# Patient Record
Sex: MALE | Race: WHITE
[De-identification: names, ages, dates, MRNs, and addresses within clinical notes are randomized per-mention and may not be internally consistent; named-entity substitution may affect disease eponyms.]

---

## 2019-03-05 ENCOUNTER — HOSPITAL ENCOUNTER (OUTPATIENT)
Dept: HOSPITAL 75 - RAD | Age: 64
End: 2019-03-05
Attending: INTERNAL MEDICINE
Payer: COMMERCIAL

## 2019-03-05 DIAGNOSIS — N28.1: ICD-10-CM

## 2019-03-05 DIAGNOSIS — G47.33: ICD-10-CM

## 2019-03-05 DIAGNOSIS — I10: ICD-10-CM

## 2019-03-05 DIAGNOSIS — J44.9: ICD-10-CM

## 2019-03-05 DIAGNOSIS — I70.203: Primary | ICD-10-CM

## 2019-03-05 DIAGNOSIS — E78.2: ICD-10-CM

## 2019-03-05 LAB
BUN/CREAT SERPL: 12
CREAT SERPL-MCNC: 1.06 MG/DL (ref 0.6–1.3)
GFR SERPLBLD BASED ON 1.73 SQ M-ARVRAT: > 60 ML/MIN

## 2019-03-05 PROCEDURE — 36415 COLL VENOUS BLD VENIPUNCTURE: CPT

## 2019-03-05 PROCEDURE — 84520 ASSAY OF UREA NITROGEN: CPT

## 2019-03-05 PROCEDURE — 82565 ASSAY OF CREATININE: CPT

## 2019-03-05 NOTE — DIAGNOSTIC IMAGING REPORT
INDICATION: Hypertension and claudication and increased serum

lipids. CTA of the abdomen and pelvis and lower extremities

performed with IV contrast bolus and axial slices and MIP

reconstructions.



FINDINGS: Visualized portions of the liver and gallbladder were

normal. The entire liver was not imaged. The adrenals and

pancreas were normal. There is a prominent cyst in the right

kidney measuring 6.5 cm. There is no retroperitoneal mass or

adenopathy. There is no ascites or abnormal fluid collection.

There are uncomplicated sigmoid diverticula. There is prostatic

enlargement. There is a right knee prosthesis.



CTA images demonstrate the abdominal aorta to be patent and

nonaneurysmal. There is no significant plaquing in the infrarenal

aorta. The common iliac arteries and internal iliac arteries and

external iliac arteries are patent and without significant

plaquing or narrowing. The common femoral arteries and profunda

femoris arteries are patent. The superficial femoral arteries are

patent on both sides with minor plaquing in the mid SFA on both

sides. There is no significant stenosis. The popliteal arteries

on both sides are patent. All three tibial vessels on both sides

are patent and without stenosis or occlusion.



IMPRESSION: No significant peripheral vascular disease. There is

no major vessel occlusion or stenosis. There is minor plaquing in

the mid SFA on both sides. A prominent right renal cyst is

incidentally noted. There is no acute abnormality.



Dictated by: 



  Dictated on workstation # [id number]

## 2019-03-06 ENCOUNTER — HOSPITAL ENCOUNTER (OUTPATIENT)
Dept: HOSPITAL 75 - CARD | Age: 64
End: 2019-03-06
Attending: INTERNAL MEDICINE
Payer: COMMERCIAL

## 2019-03-06 VITALS — SYSTOLIC BLOOD PRESSURE: 134 MMHG | DIASTOLIC BLOOD PRESSURE: 82 MMHG

## 2019-03-06 VITALS — WEIGHT: 239 LBS | HEIGHT: 72 IN | BODY MASS INDEX: 32.37 KG/M2

## 2019-03-06 DIAGNOSIS — J44.9: ICD-10-CM

## 2019-03-06 DIAGNOSIS — E78.2: ICD-10-CM

## 2019-03-06 DIAGNOSIS — I10: ICD-10-CM

## 2019-03-06 DIAGNOSIS — G47.33: ICD-10-CM

## 2019-03-06 DIAGNOSIS — I73.9: Primary | ICD-10-CM

## 2019-03-06 PROCEDURE — 93017 CV STRESS TEST TRACING ONLY: CPT

## 2019-03-06 PROCEDURE — 93306 TTE W/DOPPLER COMPLETE: CPT

## 2019-03-06 PROCEDURE — 78452 HT MUSCLE IMAGE SPECT MULT: CPT

## 2019-03-06 NOTE — STRESS TEST
DATE OF SERVICE:  03/06/2019



LEXISCAN MYOVIEW STRESS TEST REPORT



REFERRING PHYSICIAN:

Dr. Fregoso.



FINDINGS:

Baseline heart rate is 68.  Baseline blood pressure is 139/84.  Baseline EKG is

sinus rhythm with no ischemic changes.



SUMMARY:

The patient was injected with 10.93 mCi of technetium-99 Myoview and the resting

images were obtained.  Then, the patient started exercising with a baseline

heart rate, blood pressure and EKG mentioned above.  The patient was able to

exercise for 4 minutes and 40 seconds on a standard Moustapha protocol, did not

achieve the target heart rate.  Test was terminated and converted to Lexiscan

Myoview stress test.



The patient received 0.4 mg of Lexiscan followed by 30.5 mCi of technetium-99

Myoview.  Throughout the test, there were no EKG changes.



The resting and stress images were reviewed and compared in the short axis,

horizontal long axis and vertical long axis views.  Review of the images showed

good radiotracer uptake with diaphragmatic attenuation, no significant ischemia

or infarction.  SSS is 0.  TID value is 1.04.  On the gated images, the left

ventricle appeared to be in normal size with normal contractility.  Calculated

ejection fraction is 59%.



CONCLUSION:

1.  The patient was able to exercise for 4 minutes 40 seconds on a standard

Moustapha protocol, did not achieve the target heart rate, test was terminated and

converted to Lexiscan Myoview stress test.

2.  The patient tolerated the Lexiscan well.

3.  Diaphragmatic attenuation with typical male pattern with no significant

ischemia or infarction on SPECT images.

4.  Normal left ventricular size with normal contractility and calculated

ejection fraction of 59%.





Job ID: [id number]

DocumentID: [id number]

Dictated Date:  03/06/2019 15:21:18

Transcription Date: 03/06/2019 15:43:58

Dictated By: MATILDE LASSITER MD

## 2019-10-01 ENCOUNTER — HOSPITAL ENCOUNTER (OUTPATIENT)
Dept: HOSPITAL 75 - RT | Age: 64
End: 2019-10-01
Attending: NURSE PRACTITIONER
Payer: COMMERCIAL

## 2019-10-01 DIAGNOSIS — G47.33: ICD-10-CM

## 2019-10-01 DIAGNOSIS — Z72.0: ICD-10-CM

## 2019-10-01 DIAGNOSIS — J30.9: Primary | ICD-10-CM

## 2019-10-01 LAB
ARTERIAL PATENCY WRIST A: (no result)
BASE EXCESS STD BLDA CALC-SCNC: -1.1 MMOL/L (ref -2.5–2.5)
BDY SITE: (no result)
BODY TEMPERATURE: 99.1
CO2 BLDA CALC-SCNC: 24.3 MMOL/L (ref 21–31)
INHALED O2 FLOW RATE: (no result) L/MIN
PCO2 BLDA: 39 MMHG (ref 35–45)
PH BLDA: 7.39 [PH] (ref 7.37–7.43)
PO2 BLDA: 95 MMHG (ref 79–93)
SAO2 % BLDA FROM PO2: 98 % (ref 94–100)
VENTILATION MODE VENT: NO

## 2019-10-01 PROCEDURE — 36600 WITHDRAWAL OF ARTERIAL BLOOD: CPT

## 2019-10-01 PROCEDURE — 82805 BLOOD GASES W/O2 SATURATION: CPT

## 2019-11-06 ENCOUNTER — HOSPITAL ENCOUNTER (OUTPATIENT)
Dept: HOSPITAL 75 - RAD | Age: 64
End: 2019-11-06
Attending: NURSE PRACTITIONER
Payer: COMMERCIAL

## 2019-11-06 DIAGNOSIS — K76.0: ICD-10-CM

## 2019-11-06 DIAGNOSIS — Z72.0: ICD-10-CM

## 2019-11-06 DIAGNOSIS — G47.33: ICD-10-CM

## 2019-11-06 DIAGNOSIS — J30.9: Primary | ICD-10-CM

## 2019-11-06 LAB
BUN/CREAT SERPL: 10
CREAT SERPL-MCNC: 1.24 MG/DL (ref 0.6–1.3)
GFR SERPLBLD BASED ON 1.73 SQ M-ARVRAT: 59 ML/MIN

## 2019-11-06 PROCEDURE — 36415 COLL VENOUS BLD VENIPUNCTURE: CPT

## 2019-11-06 PROCEDURE — 94726 PLETHYSMOGRAPHY LUNG VOLUMES: CPT

## 2019-11-06 PROCEDURE — 82565 ASSAY OF CREATININE: CPT

## 2019-11-06 PROCEDURE — 84520 ASSAY OF UREA NITROGEN: CPT

## 2019-11-06 PROCEDURE — 71260 CT THORAX DX C+: CPT

## 2019-11-06 PROCEDURE — 94729 DIFFUSING CAPACITY: CPT

## 2019-11-06 PROCEDURE — 94060 EVALUATION OF WHEEZING: CPT

## 2019-11-06 NOTE — DIAGNOSTIC IMAGING REPORT
PROCEDURE: CT chest with contrast only.



TECHNIQUE: Multiple contiguous axial images were obtained through

the chest after administration of intravenous contrast. Auto

Exposure Controls were utilized during the CT exam to meet ALARA

standards for radiation dose reduction. 



INDICATION:  Dyspnea.



COMPARISON: None available.



FINDINGS: No significant adenopathy within the chest. Two-vessel

aortic arch is noted. No aneurysmal dilatation of the thoracic

aorta. The heart is within normal limits in size. No pericardial

effusion. No pleural effusion. No pneumothorax. Minimal bibasilar

scarring and/or atelectasis. The lungs otherwise appear clear.

The airway is patent. Fatty infiltration of the liver. The

visualized upper abdomen is otherwise unremarkable. Scattered

osseous degenerative changes without acute osseous abnormality.



IMPRESSION: No acute abnormality.



Fatty infiltration of the liver.



Dictated by: 



  Dictated on workstation # [id number]

## 2019-12-18 NOTE — NUR
Pt contacted me today to let me know he is ok with going ahead and starting pulmonary rehab. 
I had contacted pt last week to let him know that his insurance stated they would cover 30% 
until January 2020, then he would be responsible for 100% starting in January 2020, until 
deductible met again, then 30%. After he and his wife checked with financial aid, pt said he 
would be ok to go ahead and start. Pt is aware of deductible to meet starting January 2020; 
and his responsibility.

## 2020-01-07 VITALS — DIASTOLIC BLOOD PRESSURE: 80 MMHG | SYSTOLIC BLOOD PRESSURE: 130 MMHG

## 2020-01-07 VITALS — SYSTOLIC BLOOD PRESSURE: 127 MMHG | DIASTOLIC BLOOD PRESSURE: 97 MMHG

## 2020-01-09 VITALS — SYSTOLIC BLOOD PRESSURE: 140 MMHG | DIASTOLIC BLOOD PRESSURE: 60 MMHG

## 2020-01-09 VITALS — SYSTOLIC BLOOD PRESSURE: 130 MMHG | DIASTOLIC BLOOD PRESSURE: 60 MMHG

## 2020-01-14 VITALS — SYSTOLIC BLOOD PRESSURE: 138 MMHG | DIASTOLIC BLOOD PRESSURE: 90 MMHG

## 2020-01-14 VITALS — SYSTOLIC BLOOD PRESSURE: 122 MMHG | DIASTOLIC BLOOD PRESSURE: 90 MMHG

## 2020-01-16 VITALS — SYSTOLIC BLOOD PRESSURE: 133 MMHG | DIASTOLIC BLOOD PRESSURE: 60 MMHG

## 2020-01-16 VITALS — DIASTOLIC BLOOD PRESSURE: 60 MMHG | SYSTOLIC BLOOD PRESSURE: 120 MMHG

## 2020-01-21 VITALS — DIASTOLIC BLOOD PRESSURE: 80 MMHG | SYSTOLIC BLOOD PRESSURE: 140 MMHG

## 2020-01-21 VITALS — SYSTOLIC BLOOD PRESSURE: 130 MMHG | DIASTOLIC BLOOD PRESSURE: 60 MMHG

## 2020-01-23 VITALS — DIASTOLIC BLOOD PRESSURE: 90 MMHG | SYSTOLIC BLOOD PRESSURE: 140 MMHG

## 2020-01-23 VITALS — SYSTOLIC BLOOD PRESSURE: 120 MMHG | DIASTOLIC BLOOD PRESSURE: 76 MMHG

## 2020-01-28 VITALS — SYSTOLIC BLOOD PRESSURE: 118 MMHG | DIASTOLIC BLOOD PRESSURE: 90 MMHG

## 2020-01-28 VITALS — SYSTOLIC BLOOD PRESSURE: 130 MMHG | DIASTOLIC BLOOD PRESSURE: 60 MMHG

## 2020-01-30 VITALS — SYSTOLIC BLOOD PRESSURE: 140 MMHG | DIASTOLIC BLOOD PRESSURE: 50 MMHG

## 2020-01-30 VITALS — SYSTOLIC BLOOD PRESSURE: 138 MMHG | DIASTOLIC BLOOD PRESSURE: 82 MMHG

## 2020-02-04 VITALS — SYSTOLIC BLOOD PRESSURE: 130 MMHG | DIASTOLIC BLOOD PRESSURE: 90 MMHG

## 2020-02-04 VITALS — SYSTOLIC BLOOD PRESSURE: 110 MMHG | DIASTOLIC BLOOD PRESSURE: 88 MMHG

## 2020-02-06 VITALS — SYSTOLIC BLOOD PRESSURE: 137 MMHG | DIASTOLIC BLOOD PRESSURE: 88 MMHG

## 2020-02-06 VITALS — DIASTOLIC BLOOD PRESSURE: 81 MMHG | SYSTOLIC BLOOD PRESSURE: 122 MMHG

## 2020-02-11 VITALS — DIASTOLIC BLOOD PRESSURE: 87 MMHG | SYSTOLIC BLOOD PRESSURE: 130 MMHG

## 2020-02-11 VITALS — DIASTOLIC BLOOD PRESSURE: 93 MMHG | SYSTOLIC BLOOD PRESSURE: 130 MMHG

## 2020-02-13 VITALS — DIASTOLIC BLOOD PRESSURE: 60 MMHG | SYSTOLIC BLOOD PRESSURE: 172 MMHG

## 2020-02-13 VITALS — SYSTOLIC BLOOD PRESSURE: 140 MMHG | DIASTOLIC BLOOD PRESSURE: 60 MMHG

## 2020-02-18 VITALS — DIASTOLIC BLOOD PRESSURE: 89 MMHG | SYSTOLIC BLOOD PRESSURE: 112 MMHG

## 2020-02-18 VITALS — DIASTOLIC BLOOD PRESSURE: 60 MMHG | SYSTOLIC BLOOD PRESSURE: 130 MMHG

## 2020-02-20 VITALS — SYSTOLIC BLOOD PRESSURE: 118 MMHG | DIASTOLIC BLOOD PRESSURE: 80 MMHG

## 2020-02-20 VITALS — DIASTOLIC BLOOD PRESSURE: 90 MMHG | SYSTOLIC BLOOD PRESSURE: 121 MMHG

## 2020-02-25 VITALS — DIASTOLIC BLOOD PRESSURE: 60 MMHG | SYSTOLIC BLOOD PRESSURE: 138 MMHG

## 2020-02-25 VITALS — DIASTOLIC BLOOD PRESSURE: 72 MMHG | SYSTOLIC BLOOD PRESSURE: 130 MMHG

## 2020-02-27 VITALS — DIASTOLIC BLOOD PRESSURE: 88 MMHG | SYSTOLIC BLOOD PRESSURE: 138 MMHG

## 2020-02-27 VITALS — SYSTOLIC BLOOD PRESSURE: 140 MMHG | DIASTOLIC BLOOD PRESSURE: 60 MMHG

## 2020-03-03 ENCOUNTER — HOSPITAL ENCOUNTER (OUTPATIENT)
Dept: HOSPITAL 75 - PULM | Age: 65
LOS: 7 days | Discharge: HOME | End: 2020-03-10
Attending: NURSE PRACTITIONER
Payer: COMMERCIAL

## 2020-03-03 VITALS — DIASTOLIC BLOOD PRESSURE: 50 MMHG | SYSTOLIC BLOOD PRESSURE: 128 MMHG

## 2020-03-03 VITALS — SYSTOLIC BLOOD PRESSURE: 160 MMHG | DIASTOLIC BLOOD PRESSURE: 60 MMHG

## 2020-03-03 DIAGNOSIS — J44.9: Primary | ICD-10-CM

## 2020-03-03 PROCEDURE — 99211 OFF/OP EST MAY X REQ PHY/QHP: CPT

## 2020-03-05 VITALS — DIASTOLIC BLOOD PRESSURE: 87 MMHG | SYSTOLIC BLOOD PRESSURE: 127 MMHG

## 2020-03-05 VITALS — DIASTOLIC BLOOD PRESSURE: 82 MMHG | SYSTOLIC BLOOD PRESSURE: 125 MMHG

## 2020-11-09 ENCOUNTER — HOSPITAL ENCOUNTER (OUTPATIENT)
Dept: HOSPITAL 75 - RAD | Age: 65
End: 2020-11-09
Attending: NURSE PRACTITIONER
Payer: COMMERCIAL

## 2020-11-09 DIAGNOSIS — Z72.0: ICD-10-CM

## 2020-11-09 DIAGNOSIS — R91.8: Primary | ICD-10-CM

## 2020-11-09 NOTE — DIAGNOSTIC IMAGING REPORT
CT CHEST SCREENING WO



TECHNIQUE: Low-dose unenhanced CT of the chest was performed

according to the screening protocol. Coronal MIP and sagittal MPR

reformats are created. Automatic exposure controls were utilized

to keep dose as low as reasonably achievable.



INDICATION: 44 pack year history of smoking. Quit smoking one

year ago.



COMPARISON: CT chest of 11/06/2019



FINDINGS:



Pulmonary findings: No endoluminal nodule within the trachea. No

pulmonary mass or consolidation. Scattered peripheral groundglass

opacities have developed and are most indicative of sequelae of

infection/inflammation. No pulmonary nodule or concerning

groundglass nodules. Mild centrilobular emphysema is similar

appearance to prior exam.



Extrapulmonary findings: No pleural effusion or axillary

lymphadenopathy. No mediastinal, discrete hilar or juxtaphrenic

lymphadenopathy. Heart is normal in size without pericardial

effusion. Dense coronary artery calcifications are unchanged.

Esophagus is unremarkable. Stable low-attenuation liver raises

possibility hepatic steatosis. No worrisome focal osseous

lesions.



IMPRESSION: 

1. Screening exam is negative for features of clinically active

lung cancer.

2. Multifocal subpleural groundglass opacities are new since exam

1 year prior, and/or most compatible with with sequelae of

infectious/inflammatory process.



Lung-RADS category: 2 - Benign appearance or behavior



Recommendations: Continued annual screening with low-dose CT in

12 months.



Dictated by: 



  Dictated on workstation # [id number]

## 2021-02-04 ENCOUNTER — HOSPITAL ENCOUNTER (OUTPATIENT)
Dept: HOSPITAL 75 - RT | Age: 66
End: 2021-02-04
Attending: NURSE PRACTITIONER
Payer: COMMERCIAL

## 2021-02-04 DIAGNOSIS — J44.9: Primary | ICD-10-CM

## 2021-02-04 PROCEDURE — 94726 PLETHYSMOGRAPHY LUNG VOLUMES: CPT

## 2021-02-04 PROCEDURE — 94729 DIFFUSING CAPACITY: CPT

## 2021-02-04 PROCEDURE — 94060 EVALUATION OF WHEEZING: CPT

## 2021-02-12 ENCOUNTER — HOSPITAL ENCOUNTER (OUTPATIENT)
Dept: HOSPITAL 75 - RAD | Age: 66
End: 2021-02-12
Attending: NURSE PRACTITIONER
Payer: COMMERCIAL

## 2021-02-12 DIAGNOSIS — K76.0: ICD-10-CM

## 2021-02-12 DIAGNOSIS — J43.2: Primary | ICD-10-CM

## 2021-02-12 DIAGNOSIS — I25.10: ICD-10-CM

## 2021-02-12 LAB
BUN/CREAT SERPL: 13
CREAT SERPL-MCNC: 1.16 MG/DL (ref 0.6–1.3)
GFR SERPLBLD BASED ON 1.73 SQ M-ARVRAT: > 60 ML/MIN

## 2021-02-12 PROCEDURE — 36415 COLL VENOUS BLD VENIPUNCTURE: CPT

## 2021-02-12 PROCEDURE — 82565 ASSAY OF CREATININE: CPT

## 2021-02-12 PROCEDURE — 84520 ASSAY OF UREA NITROGEN: CPT

## 2021-02-12 PROCEDURE — 71260 CT THORAX DX C+: CPT

## 2021-02-12 NOTE — DIAGNOSTIC IMAGING REPORT
PROCEDURE: CT chest with contrast only.



TECHNIQUE: Multiple contiguous axial images were obtained through

the chest after administration of intravenous contrast. Auto

Exposure Controls were utilized during the CT exam to meet ALARA

standards for radiation dose reduction. 



DATE: February 12, 2021.



COMPARISON: CT chest November 6, 2019. CT chest November 9, 2020.



INDICATION: 65-year-old male, shortness of breath on exertion.



FINDINGS: 



There are upper lobe predominant findings of centrilobular and

paraseptal emphysema. There is a 2 mm noncalcified left upper

lobe pulmonary nodule on axial image 51 which is most likely

unchanged since November 6, 2019. There is no otherwise

identified pulmonary nodule. There is no lung mass. There is no

otherwise noted focal airspace consolidation. There is no

pneumothorax. There is no pleural effusion. The central airways

are patent.



There is no identified central pulmonary embolus. There is air in

the main pulmonary artery likely relating to venous access. The

main pulmonary artery diameter is within normal limits. The heart

is not enlarged. There is no pericardial effusion. There are

coronary artery calcifications and additional areas of

atherosclerotic disease.



There is no identified abnormally enlarged mediastinal, hilar, or

axillary lymph node meeting CT size criteria for adenopathy.



There is diffuse fatty infiltration of the liver. Additional

evaluation of the imaged portions of the upper abdomen is

unremarkable.



There is no identified acute bony abnormality. There are mild

disc degenerative changes of the thoracic spine.



IMPRESSION: 



CT CHEST. 

1. No identified acute cardiopulmonary abnormality.

2. Mild upper lobe predominant findings of centrilobular and

paraseptal emphysema.

3. Coronary artery calcifications and additional areas of

atherosclerotic disease.

4. Diffuse fatty infiltration of the liver.





Dictated by: 



  Dictated on workstation # [id number]

## 2023-10-05 ENCOUNTER — HOSPITAL ENCOUNTER (OUTPATIENT)
Dept: HOSPITAL 75 - CARD | Age: 68
End: 2023-10-05
Attending: INTERNAL MEDICINE
Payer: MEDICARE

## 2023-10-05 DIAGNOSIS — I10: Primary | ICD-10-CM

## 2023-10-05 PROCEDURE — 93306 TTE W/DOPPLER COMPLETE: CPT
